# Patient Record
Sex: FEMALE | Race: WHITE | HISPANIC OR LATINO | Employment: FULL TIME | URBAN - METROPOLITAN AREA
[De-identification: names, ages, dates, MRNs, and addresses within clinical notes are randomized per-mention and may not be internally consistent; named-entity substitution may affect disease eponyms.]

---

## 2017-01-04 ENCOUNTER — HOSPITAL ENCOUNTER (INPATIENT)
Facility: HOSPITAL | Age: 22
LOS: 2 days | Discharge: HOME | End: 2017-01-06
Attending: OBSTETRICS & GYNECOLOGY | Admitting: OBSTETRICS & GYNECOLOGY
Payer: COMMERCIAL

## 2017-01-04 ENCOUNTER — ANESTHESIA (OUTPATIENT)
Dept: OBSTETRICS AND GYNECOLOGY | Facility: HOSPITAL | Age: 22
End: 2017-01-04
Payer: COMMERCIAL

## 2017-01-04 ENCOUNTER — ANESTHESIA EVENT (OUTPATIENT)
Dept: OBSTETRICS AND GYNECOLOGY | Facility: HOSPITAL | Age: 22
End: 2017-01-04
Payer: COMMERCIAL

## 2017-01-04 DIAGNOSIS — O47.9 UTERINE CONTRACTIONS DURING PREGNANCY: ICD-10-CM

## 2017-01-04 DIAGNOSIS — Z34.03 ENCOUNTER FOR SUPERVISION OF NORMAL FIRST PREGNANCY IN THIRD TRIMESTER: ICD-10-CM

## 2017-01-04 LAB
ABO + RH BLD: NORMAL
BASOPHILS # BLD AUTO: 0.01 K/UL
BASOPHILS NFR BLD: 0.1 %
BLD GP AB SCN CELLS X3 SERPL QL: NORMAL
DIFFERENTIAL METHOD: NORMAL
EOSINOPHIL # BLD AUTO: 0 K/UL
EOSINOPHIL NFR BLD: 0.3 %
ERYTHROCYTE [DISTWIDTH] IN BLOOD BY AUTOMATED COUNT: 12.6 %
FETAL CELL SCN BLD QL ROSETTE: NORMAL
HCT VFR BLD AUTO: 37.9 %
HGB BLD-MCNC: 13 G/DL
LYMPHOCYTES # BLD AUTO: 3.3 K/UL
LYMPHOCYTES NFR BLD: 31.8 %
MCH RBC QN AUTO: 29.9 PG
MCHC RBC AUTO-ENTMCNC: 34.3 %
MCV RBC AUTO: 87 FL
MONOCYTES # BLD AUTO: 0.9 K/UL
MONOCYTES NFR BLD: 8.1 %
NEUTROPHILS # BLD AUTO: 6.2 K/UL
NEUTROPHILS NFR BLD: 59 %
PLATELET # BLD AUTO: 210 K/UL
PMV BLD AUTO: 9.3 FL
RBC # BLD AUTO: 4.35 M/UL
WBC # BLD AUTO: 10.47 K/UL

## 2017-01-04 PROCEDURE — 59025 FETAL NON-STRESS TEST: CPT

## 2017-01-04 PROCEDURE — 85025 COMPLETE CBC W/AUTO DIFF WBC: CPT

## 2017-01-04 PROCEDURE — 72100002 HC LABOR CARE, 1ST 8 HOURS

## 2017-01-04 PROCEDURE — 86900 BLOOD TYPING SEROLOGIC ABO: CPT | Mod: 91

## 2017-01-04 PROCEDURE — 86850 RBC ANTIBODY SCREEN: CPT

## 2017-01-04 PROCEDURE — 25000003 PHARM REV CODE 250

## 2017-01-04 PROCEDURE — 27800516 HC TRAY, EPIDURAL COMBO: Performed by: ANESTHESIOLOGY

## 2017-01-04 PROCEDURE — 85461 HEMOGLOBIN FETAL: CPT

## 2017-01-04 PROCEDURE — 86900 BLOOD TYPING SEROLOGIC ABO: CPT

## 2017-01-04 PROCEDURE — 11000001 HC ACUTE MED/SURG PRIVATE ROOM

## 2017-01-04 PROCEDURE — 99211 OFF/OP EST MAY X REQ PHY/QHP: CPT

## 2017-01-04 PROCEDURE — 86901 BLOOD TYPING SEROLOGIC RH(D): CPT | Mod: 91

## 2017-01-04 PROCEDURE — 25000003 PHARM REV CODE 250: Performed by: OBSTETRICS & GYNECOLOGY

## 2017-01-04 PROCEDURE — 72100003 HC LABOR CARE, EA. ADDL. 8 HRS

## 2017-01-04 PROCEDURE — 27200710 HC EPIDURAL INFUSION PUMP SET: Performed by: ANESTHESIOLOGY

## 2017-01-04 PROCEDURE — 51702 INSERT TEMP BLADDER CATH: CPT

## 2017-01-04 PROCEDURE — 59409 OBSTETRICAL CARE: CPT | Mod: ,,, | Performed by: OBSTETRICS & GYNECOLOGY

## 2017-01-04 PROCEDURE — 25000003 PHARM REV CODE 250: Performed by: ANESTHESIOLOGY

## 2017-01-04 PROCEDURE — 27201127 HC VACUUM EXTRACTOR

## 2017-01-04 PROCEDURE — 36415 COLL VENOUS BLD VENIPUNCTURE: CPT

## 2017-01-04 PROCEDURE — 72200006 HC VAGINAL DELIVERY LEVEL III

## 2017-01-04 PROCEDURE — 10907ZC DRAINAGE OF AMNIOTIC FLUID, THERAPEUTIC FROM PRODUCTS OF CONCEPTION, VIA NATURAL OR ARTIFICIAL OPENING: ICD-10-PCS | Performed by: OBSTETRICS & GYNECOLOGY

## 2017-01-04 RX ORDER — FAMOTIDINE 10 MG/ML
20 INJECTION INTRAVENOUS ONCE AS NEEDED
Status: DISCONTINUED | OUTPATIENT
Start: 2017-01-04 | End: 2017-01-04

## 2017-01-04 RX ORDER — IBUPROFEN 800 MG/1
800 TABLET ORAL EVERY 8 HOURS PRN
Qty: 30 TABLET | Refills: 0 | Status: SHIPPED | OUTPATIENT
Start: 2017-01-04 | End: 2017-02-17

## 2017-01-04 RX ORDER — SODIUM CHLORIDE, SODIUM LACTATE, POTASSIUM CHLORIDE, CALCIUM CHLORIDE 600; 310; 30; 20 MG/100ML; MG/100ML; MG/100ML; MG/100ML
INJECTION, SOLUTION INTRAVENOUS CONTINUOUS
Status: DISCONTINUED | OUTPATIENT
Start: 2017-01-04 | End: 2017-01-04

## 2017-01-04 RX ORDER — SODIUM CITRATE AND CITRIC ACID MONOHYDRATE 334; 500 MG/5ML; MG/5ML
30 SOLUTION ORAL ONCE AS NEEDED
Status: DISCONTINUED | OUTPATIENT
Start: 2017-01-04 | End: 2017-01-04

## 2017-01-04 RX ORDER — ACETAMINOPHEN 325 MG/1
650 TABLET ORAL EVERY 6 HOURS PRN
Status: DISCONTINUED | OUTPATIENT
Start: 2017-01-04 | End: 2017-01-06 | Stop reason: HOSPADM

## 2017-01-04 RX ORDER — MISOPROSTOL 200 UG/1
600 TABLET ORAL
Status: DISCONTINUED | OUTPATIENT
Start: 2017-01-04 | End: 2017-01-04

## 2017-01-04 RX ORDER — OXYCODONE AND ACETAMINOPHEN 10; 325 MG/1; MG/1
1 TABLET ORAL EVERY 4 HOURS PRN
Status: DISCONTINUED | OUTPATIENT
Start: 2017-01-04 | End: 2017-01-06 | Stop reason: HOSPADM

## 2017-01-04 RX ORDER — OXYCODONE AND ACETAMINOPHEN 5; 325 MG/1; MG/1
1 TABLET ORAL EVERY 6 HOURS PRN
Qty: 30 TABLET | Refills: 0 | Status: SHIPPED | OUTPATIENT
Start: 2017-01-04 | End: 2017-02-17

## 2017-01-04 RX ORDER — ONDANSETRON 8 MG/1
8 TABLET, ORALLY DISINTEGRATING ORAL EVERY 8 HOURS PRN
Status: DISCONTINUED | OUTPATIENT
Start: 2017-01-04 | End: 2017-01-04

## 2017-01-04 RX ORDER — DOCUSATE SODIUM 100 MG/1
200 CAPSULE, LIQUID FILLED ORAL 2 TIMES DAILY PRN
Status: DISCONTINUED | OUTPATIENT
Start: 2017-01-04 | End: 2017-01-06 | Stop reason: HOSPADM

## 2017-01-04 RX ORDER — OXYTOCIN/RINGER'S LACTATE 20/1000 ML
2 PLASTIC BAG, INJECTION (ML) INTRAVENOUS CONTINUOUS
Status: DISCONTINUED | OUTPATIENT
Start: 2017-01-04 | End: 2017-01-04

## 2017-01-04 RX ORDER — IBUPROFEN 600 MG/1
600 TABLET ORAL EVERY 6 HOURS PRN
Status: DISCONTINUED | OUTPATIENT
Start: 2017-01-04 | End: 2017-01-06 | Stop reason: HOSPADM

## 2017-01-04 RX ORDER — OXYTOCIN/RINGER'S LACTATE 20/1000 ML
41.65 PLASTIC BAG, INJECTION (ML) INTRAVENOUS CONTINUOUS
Status: DISPENSED | OUTPATIENT
Start: 2017-01-04 | End: 2017-01-05

## 2017-01-04 RX ORDER — DIPHENHYDRAMINE HCL 25 MG
25 CAPSULE ORAL EVERY 4 HOURS PRN
Status: DISCONTINUED | OUTPATIENT
Start: 2017-01-04 | End: 2017-01-06 | Stop reason: HOSPADM

## 2017-01-04 RX ORDER — TERBUTALINE SULFATE 1 MG/ML
0.25 INJECTION SUBCUTANEOUS
Status: DISCONTINUED | OUTPATIENT
Start: 2017-01-04 | End: 2017-01-04

## 2017-01-04 RX ORDER — METOCLOPRAMIDE HYDROCHLORIDE 5 MG/ML
10 INJECTION INTRAMUSCULAR; INTRAVENOUS ONCE AS NEEDED
Status: DISCONTINUED | OUTPATIENT
Start: 2017-01-04 | End: 2017-01-04

## 2017-01-04 RX ORDER — ONDANSETRON 8 MG/1
8 TABLET, ORALLY DISINTEGRATING ORAL EVERY 8 HOURS PRN
Status: DISCONTINUED | OUTPATIENT
Start: 2017-01-04 | End: 2017-01-06 | Stop reason: HOSPADM

## 2017-01-04 RX ORDER — ZOLPIDEM TARTRATE 5 MG/1
5 TABLET ORAL NIGHTLY PRN
Status: DISCONTINUED | OUTPATIENT
Start: 2017-01-04 | End: 2017-01-06 | Stop reason: HOSPADM

## 2017-01-04 RX ORDER — OXYCODONE AND ACETAMINOPHEN 5; 325 MG/1; MG/1
1 TABLET ORAL EVERY 4 HOURS PRN
Status: DISCONTINUED | OUTPATIENT
Start: 2017-01-04 | End: 2017-01-06 | Stop reason: HOSPADM

## 2017-01-04 RX ORDER — DIPHENHYDRAMINE HYDROCHLORIDE 50 MG/ML
25 INJECTION INTRAMUSCULAR; INTRAVENOUS EVERY 4 HOURS PRN
Status: DISCONTINUED | OUTPATIENT
Start: 2017-01-04 | End: 2017-01-06 | Stop reason: HOSPADM

## 2017-01-04 RX ADMIN — OXYCODONE HYDROCHLORIDE AND ACETAMINOPHEN 1 TABLET: 5; 325 TABLET ORAL at 06:01

## 2017-01-04 RX ADMIN — OXYCODONE HYDROCHLORIDE AND ACETAMINOPHEN 1 TABLET: 10; 325 TABLET ORAL at 09:01

## 2017-01-04 RX ADMIN — IBUPROFEN 600 MG: 600 TABLET, FILM COATED ORAL at 05:01

## 2017-01-04 RX ADMIN — SODIUM CHLORIDE, SODIUM LACTATE, POTASSIUM CHLORIDE, AND CALCIUM CHLORIDE 1000 ML: .6; .31; .03; .02 INJECTION, SOLUTION INTRAVENOUS at 07:01

## 2017-01-04 RX ADMIN — Medication 12 ML/HR: at 07:01

## 2017-01-04 RX ADMIN — Medication 2 MILLI-UNITS/MIN: at 11:01

## 2017-01-04 RX ADMIN — SODIUM CHLORIDE, SODIUM LACTATE, POTASSIUM CHLORIDE, AND CALCIUM CHLORIDE: .6; .31; .03; .02 INJECTION, SOLUTION INTRAVENOUS at 08:01

## 2017-01-04 NOTE — PLAN OF CARE
0700 Report received from YAMILET Morales Rn at bedside, , spont labor, SVE at 0600 2/60/-4, Admit orders received from Dr Sarmiento per BINDU Morales RN, Lab at bedside, IV bolus in progress for epidural, anesthesia notified pt is ready for epidural. Plan of care reviewed for labor and epidural, Pt can recall information, mother at bedside.     0735 Anesthesia at bedside, Pt positioned for epidural, time out at 0740, start at 0742, test dose at 0750, negative, Pt educated on strict bedrest, purpose of jimenez signs to report to Rn, Pt can recall all information.     0830 Dr Sarmiento updated on pt progress, will come check pt between [id number]. Pt resting comfortably. Mother at bedside.     0910 Dr Sarmiento at bedside, SVE 4/80/-3, AROM, clear, Pt denies pain reviewed plan of care, will recheck cervix at 1100. Pt can recall all information.     1100 SVE 5/90/-3, Pitocin started per orders, Pt educated on Pitocin, Pt moved to right lateral to aid in fetal decent. Pt resting comfortably, denies pain.     1300 Early decel noted, SVE 9/90/0, Dr Sarmiento notified of pt progress, will recheck at 1400,     1400 SVE complete/+1, pt pushes to +2, decel during pushing, pt placed in right tilt. Dr Sarmiento paged. Will be on floor shortly. Room set for delivery.     1425 Dr Sarmiento at bedside, pushing begins at 1427, at 1438 vacuum applied, FHR 80's not recovering, 6 pulls with 2 separate ctx, RML episiotomy, delivery of viable baby girl at 1443, 8/9 apgars. Recovery plan reviewed with pt and pt family.     1545 Pulse ox reading was 180-200, manual pulse taken 92 bpm.

## 2017-01-04 NOTE — PROGRESS NOTES
DATE OF DELIVERY: 01/04/2017  TYPE OF DELIVERY: vacuum assisted vaginal delivery    NARRATIVE:  The patient was fully dilated and pushing under epidural anesthesia.  There was a vacuum assisted vaginal delivery at +2 station of a viable female infant over an RML episiotomy secondary to terminal bradycardia to 80s without spontaneous return to baseline. Infant noted to be in EULALIA position. Vacuum was applied twice pressure of 500mg Hg. Two 2 pulls for 30 seconds each without any pop offs. There was not a nuchal cord. The infant was placed on mothers abdomen for skin to skin and bulb suctioning performed.The umbilical cord was doubly clamped and cut.  The infant was handed to awaiting nurse.  Apgars were later awarded by the nurse of 8 and 9.  Weight 8lb 1.0oz. Umbilical venous blood obtained. Placenta delivered spontaneously and IV pitocin given. With uterine massage the uterus was firm and perivaginal bleeding was normal.  The RML was repaired with 2-0 chromic x2. There was also a hemostatic 1st degree laceration periurethral that was not repaired.  No cervical lacerations. Estimated blood loss 200cc. Needle count correct and vaginal sweep performed. Patient tolerated delivery well.    Venessa Sarmiento MD  OB/GYN  Pager: [phone]

## 2017-01-04 NOTE — ANESTHESIA PREPROCEDURE EVALUATION
2017  Joy Briggs is a 21 y.o., female  with no PMH here for IOL epidural.    [facility] Anesthesia Evaluation    I have reviewed the Patient Summary Reports.    I have reviewed the Nursing Notes.      Review of Systems  Anesthesia Hx:  Denies Family Hx of Anesthesia complications.    Social:  Non-Smoker, No Alcohol Use    Hematology/Oncology:  Hematology Normal        Cardiovascular:  Cardiovascular Normal  Denies Hypertension.     Pulmonary:  Pulmonary Normal    Renal/:  Renal/ Normal     Neurological:  Neurology Normal    Endocrine:  Endocrine Normal Denies Diabetes.        Physical Exam  General:  Well nourished    Airway/Jaw/Neck:  Airway Findings: Mouth Opening: Normal Tongue: Normal  Mallampati: I  TM Distance: Normal, at least 6 cm         Dental:  DENTAL FINDINGS: Normal   Chest/Lungs:  Chest/Lungs Findings: Clear to auscultation, Normal Respiratory Rate     Heart/Vascular:  Heart Findings: Rate: Normal  Rhythm: Regular Rhythm  Sounds: Normal      Musculoskeletal:  Musculoskeletal Findings: Normal         Anesthesia Plan  Type of Anesthesia, risks & benefits discussed:  Anesthesia Type:  CSE  Patient's Preference:   Intra-op Monitoring Plan:   Intra-op Monitoring Plan Comments:   Post Op Pain Control Plan:   Post Op Pain Control Plan Comments:   Induction:    Beta Blocker:  Patient is not currently on a Beta-Blocker (No further documentation required).       Informed Consent: Patient understands risks and agrees with Anesthesia plan.  Questions answered. Anesthesia consent signed with patient.  ASA Score: 2  emergent   Day of Surgery Review of History & Physical: I have interviewed and examined the patient. I have reviewed the patient's H&P dated:  There are no significant changes.  H&P update referred to the provider.         Ready For Surgery From Anesthesia Perspective.

## 2017-01-04 NOTE — PLAN OF CARE
Problem: Fall Risk (Adult)  Goal: Identify Related Risk Factors and Signs and Symptoms  Related risk factors and signs and symptoms are identified upon initiation of Human Response Clinical Practice Guideline (CPG)   Outcome: Ongoing (interventions implemented as appropriate)  Pt received epidural, understands strict bedrest, fall risk band on.

## 2017-01-04 NOTE — PROGRESS NOTES
"S: 21 y.o.  at 40w1d, labor    O:    Visit Vitals    /65    Pulse 80    Temp 98.2 °F (36.8 °C)    Resp 16    Ht 5' 8" (1.727 m)    Wt 64.4 kg (142 lb)    LMP 2016    SpO2 96%    Breastfeeding No    BMI 21.59 kg/m2       FHT: 130, mod BTBV, +accels  [place]: ctx q 2 min  SVE:4/80/-3 AROM clear, moderate amount      ASSESSMENT: 40w1d   Patient Active Problem List   Diagnosis    Encounter for supervision of normal first pregnancy in second trimester    Abdominal pain during pregnancy    Pregnancy    Rh negative status during pregnancy in third trimester-rhogam given 10/11/16    Uterine contractions during pregnancy    Encounter for supervision of normal first pregnancy in third trimester       PLAN:    -Continue Close Maternal/Fetal Monitoring  -Recheck 2 hours or PRN      Venessa Sarmiento MD  OB/GYN  Pager: [phone]    "

## 2017-01-04 NOTE — IP AVS SNAPSHOT
[facility]  [address]  [place] [address]  Phone: [phone]           Patient Discharge Instructions     Our goal is to set you up for success. This packet includes information on your condition, medications, and your home care. It will help you to care for yourself so you don't get sicker and need to go back to the hospital.     Please ask your nurse if you have any questions.        There are many details to remember when preparing to leave the hospital. Here is what you will need to do:    1. Take your medicine. If you are prescribed medications, review your Medication List in the following pages. You may have new medications to  at the pharmacy and others that you'll need to stop taking. Review the instructions for how and when to take your medications. Talk with your doctor or nurses if you are unsure of what to do.     2. Go to your follow-up appointments. Specific follow-up information is listed in the following pages. Your may be contacted by a transition nurse or clinical provider about future appointments. Be sure we have all of the phone numbers to reach you, if needed. Please contact your provider's office if you are unable to make an appointment.     3. Watch for warning signs. Your doctor or nurse will give you detailed warning signs to watch for and when to call for assistance. These instructions may also include educational information about your condition. If you experience any of warning signs to your health, call your doctor.               [facility]  Unless otherwise directed by your provider, please contact [facility], our nurse care line that is available for 24/7 assistance.     [phone] (toll-free)    Registered nurses in the [facility] provide clinical advisement, health education, appointment booking, and other advisory services.                    ** Verify the list of medication(s) below is accurate and up to date. Carry this  with you in case of emergency. If your medications have changed, please notify your healthcare provider.             Medication List      START taking these medications        Additional Info                      ibuprofen 800 MG tablet   Commonly known as:  ADVIL,MOTRIN   Quantity:  30 tablet   Refills:  0   Dose:  800 mg    Last time this was given:  600 mg on 1/6/2017  4:18 AM   Instructions:  Take 1 tablet (800 mg total) by mouth every 8 (eight) hours as needed for Pain.     Begin Date    AM    Noon    PM    Bedtime       oxycodone-acetaminophen 5-325 mg per tablet   Commonly known as:  PERCOCET   Quantity:  30 tablet   Refills:  0   Dose:  1 tablet    Last time this was given:  1 tablet on 1/4/2017  6:00 PM   Instructions:  Take 1 tablet by mouth every 6 (six) hours as needed for Pain.     Begin Date    AM    Noon    PM    Bedtime            Where to Get Your Medications      You can get these medications from any pharmacy     Bring a paper prescription for each of these medications     ibuprofen 800 MG tablet    oxycodone-acetaminophen 5-325 mg per tablet                  Please bring to all follow up appointments:    1. A copy of your discharge instructions.  2. All medicines you are currently taking in their original bottles.  3. Identification and insurance card.    Please arrive 15 minutes ahead of scheduled appointment time.    Please call 24 hours in advance if you must reschedule your appointment and/or time.        Your Scheduled Appointments     Feb 17, 2017 10:30 AM CST   Post Partum with MD [place] [facility] ([place])    [address] [facility]  [address]th [address], [address]  [place] [address] [phone]              Follow-up Information     Follow up with Venessa Sarmiento MD In 6 weeks.    Specialties:  Obstetrics, Obstetrics and Gynecology    Why:  Postpartum Visit    Contact information:    [address] [facility]  [address]  [place] [id number] [phone]          Discharge  Instructions     Future Orders    Activity as tolerated     Call MD for:  difficulty breathing or increased cough     Call MD for:  increased confusion or weakness     Call MD for:  persistent dizziness, light-headedness, or visual disturbances     Call MD for:  persistent nausea and vomiting or diarrhea     Call MD for:  severe persistent headache     Call MD for:  severe uncontrolled pain     Call MD for:  temperature >100.4     Call MD for:  worsening rash     Diet general     Questions:    Total calories:      Fat restriction, if any:      Protein restriction, if any:      Na restriction, if any:      Fluid restriction:      Additional restrictions:          Discharge Instructions       Patient Discharge Instructions for Postpartum Women    Resume Regular Diet  Increase activity gradually, no heavy lifting  Shower  No tampons, douching or sexual intercourse.  Discuss birth control options with your physician.  Wear a support bra  Return to work/school when you've been cleared by a physician    Call your physician if     *Fever of 100.4 or higher  *Persistent nausea/ vomiting  *Incisional drainage  *Heavy vaginal bleeding or large clots (Heavy bleeding is soaking 1 pad in an hour)  *Swelling and pain in arms or legs  *Severe headaches, blurred vision or fainting  *Shortness of breath  *Frequency and burning with urination  *Signs of postpartum depression, discuss these signs with your physician    Call lactation services for questions regarding feeding, nipple and breast care, and general questions about lactation.  They can be reached at [phone]         Understanding Postpartum Depression    You've just had a baby.  You know you should be excited and happy.  But instead you find yourself crying for no reason.  You may have trouble coping with your daily tasks.  You feel sad, tired, and hopeless most of the time.  You may even feel ashamed or guilty.  But what you're going through is not your fault and you  "can feel better.  Talk to your doctor.  He or she can help.    Depression After Childbirth    You may be weepy and tired right after giving birth.  These feelings are normal.  They're sometimes called the "baby blues."  These blues go away 2-3 weeks.  However, postpartum (meaning "after birth") depression lasts much longer and is more sever than the "baby blues."  It can make you feel sad and hopeless.  You may also fear that your baby will be harmed and worry about being a bad mother.      What is Depression?    Depression is a mood disorder that affects the way you think and feel.  The most common symptom is a feeling of deep sadness.  You may also feel as if you just can't cope with life.    Other symptoms include:      * Gaining or loosing weight  * Sleeping too much or too little  * Feeling tired all the time  * Feeling restless  * Fears of harming your baby   * Lack of interest in your baby  * Feeling worthless or guilty  * No longer finding pleasure in things you used to  * Having trouble thinking clearly or making decisions  * Thoughts of hurting yourself or your baby    What Causes Postpartum Depression    The exact causes of postpartum depression isn't known.  It may be due to changes in your hormones during and after childbirth.  You may also be tired from caring for your baby and adjusting to being a mother.  All these factors may make you feel depressed.  In some cases, your genes may also play a role.    Depression Can Be Treated    The good news is that there are many ways to treat postpartum depression.  Talking to your doctor is the first step toward feeling better.    Resources:    * National [place] [employer] Mental Health  -- 335.976.8728    www.nimh.nih.gov    * National [place] on Mental Illness --882.503.6001    Www.edwardo.org    * Mental Health [place] -- 760.524.1837     Www.nmha.org    * National Suicide Hotline --848.436.3231 (800-SUICIDE)    5480-4109 [employer]  All rights " "reserved.  This information is not intended as a substitute for professional medical care.  Always follow up with your healthcare professional's instructions.            Primary Diagnosis     Your primary diagnosis was:  Prenatal Care      Admission Information     Date & Time Provider Department CSN    1/4/2017  3:52 AM Venessa Sarmiento MD [facility] [id number]      Care Providers     Provider Role Specialty Primary office phone    Venessa Sarmiento MD Attending Provider Obstetrics [phone]      Your Vitals Were     BP Pulse Temp Resp Height Weight    101/60 (BP Location: Left arm, Patient Position: Lying, BP Method: Automatic) 70 98.6 °F (37 °C) (Oral) 18 5' 8" (1.727 m) 64.4 kg (142 lb)    Last Period SpO2 BMI          03/29/2016 100% 21.59 kg/m2        Recent Lab Values     No lab values to display.      Allergies as of 1/6/2017     No Known Allergies      Advance Directives     An advance directive is a document which, in the event you are no longer able to make decisions for yourself, tells your healthcare team what kind of treatment you do or do not want to receive, or who you would like to make those decisions for you.  If you do not currently have an advance directive, [facility] encourages you to create one.  For more information call:  [phone]), [phone] ([phone]),  or log on to [email or web address].        Language Assistance Services     ATTENTION: Language assistance services are available, free of charge. Please call [phone].      ATENCIÓN: Si habla español, tiene a gramajo disposición servicios gratuitos de asistencia lingüística. Llame al [phone].     [facility] Ý: N?u b?n nói Ti?ng Vi?t, có các d?ch v? h? tr? ngôn ng? mi?n phí dành cho b?n. G?i s? [phone].        [facility] Sign-Up     Activating your [facility] account is as easy as 1-2-3!     1) Visit [email or web address], select Sign Up Now, enter this activation code and your date of birth, then " select Next.  [email or web address]  Expires: 1/29/2017  9:14 AM      2) Create a username and password to use when you visit [facility] in the future and select a security question in case you lose your password and select Next.    3) Enter your e-mail address and click Sign Up!    Additional Information  If you have questions, please e-mail [email or web address] or call [phone] to talk to our [facility] staff. Remember, [facility] is NOT to be used for urgent needs. For medical emergencies, dial 911.          [facility] complies with applicable Federal civil rights laws and does not discriminate on the basis of race, color, national origin, age, disability, or sex.

## 2017-01-04 NOTE — H&P
"     HISTORY AND PHYSICAL       OBSTETRICS          Subjective:       Joy Briggs is a 21 y.o.  female with IUP at 40w1d weeks gestation who presents in labor. This IUP is complicated by RH negative status.  Patient reports contractions, denies vaginal bleeding, denies LOF.   Fetal Movement: normal.     PMHx: History reviewed. No pertinent past medical history.    PSHx: History reviewed. No pertinent past surgical history.    All: Review of patient's allergies indicates:  No Known Allergies    SH:   Social History     Social History    Marital status: Single     Spouse name: N/A    Number of children: N/A    Years of education: N/A     Occupational History    Not on file.     Social History Main Topics    Smoking status: Never Smoker    Smokeless tobacco: Not on file    Alcohol use No    Drug use: No    Sexual activity: Yes     Other Topics Concern    Not on file     Social History Narrative    ** Merged History Encounter **            FH:   Family History   Problem Relation Age of Onset    Diabetes Maternal Grandmother     Hypertension Maternal Grandmother        OBHx:   Obstetric History       T0      TAB0   SAB0   E0   M0   L0       # Outcome Date GA Lbr Ganesh/2nd Weight Sex Delivery Anes PTL Lv   1 Current                   Objective:         Visit Vitals    /65    Pulse 80    Temp 98.2 °F (36.8 °C)    Resp 16    Ht 5' 8" (1.727 m)    Wt 64.4 kg (142 lb)    LMP 2016    SpO2 96%    Breastfeeding No    BMI 21.59 kg/m2       General:   alert, appears stated age and cooperative   Lungs:   clear to auscultation bilaterally   Heart:   regular rate and rhythm and S1, S2 normal   Abdomen:  soft, gravid, nontender   Extremities negative edema, negative erythema   FHT: 125, mod BTBV, +accels , reactive and reassuring                 TOCO: Q 2 minutes   Cervix: 2.5/70/-3 per nursing     Lab Review  Blood Type O NEG  GBBS: negative  Rubella: Immune  RPR: NR  HIV: " negative  HepB: negative    Assessment:       40w1d weeks gestation, labor    Patient Active Problem List   Diagnosis    Encounter for supervision of normal first pregnancy in second trimester    Abdominal pain during pregnancy    Pregnancy    Rh negative status during pregnancy in third trimester-rhogam given 10/11/16    Uterine contractions during pregnancy    Encounter for supervision of normal first pregnancy in third trimester          Plan:        - Admit to L&D  - Consents reviewed   - Epidural per Anesthesia  - Recheck in 2 hrs or PRN  - CBC, type and screen  -pit augmentation as needed        Venessa Sarmiento MD  OB/GYN  Pager: [phone]

## 2017-01-04 NOTE — L&D DELIVERY NOTE
Delivery Information for  Mendez Briggs    Birth information:  YOB: 2019   Time of birth: 2:43 PM   Sex: female   Head Delivery Date/Time: 2017  2:43 PM   Delivery type: Vaginal, Vacuum (Extractor)   Gestational Age: 40w1d    Delivery Providers    Delivering clinician:  GEETA PALACIO   Other personnel:   Provider Role   DICK WOLFE Delivery Nurse   DANILO CHANG Delivery Assist   MARICARMEN CLAROS Surgical Tech                   Measurements    Weight:  3679 g            Assessment    Living status:  Yes   Apgars    1 Minute:   5 Minute:   10 Minute 15 Minute 20 Minute   Skin Color: 0  1       Heart Rate: 2  2       Reflex Irritability: 2  2       Muscle Tone: 2  2       Respiratory Effort: 2  2       Total: 8  9                  Apgars Assigned By:  DANILO CHANG RN          Assisted Delivery Details:    Forceps attempted?:  No   Vacuum extractor attempted?:  Yes   Vacuum indications:  Fetal Heart Rate or Rhythm Abnormality   Vacuum type:  Kiwi Pro (bell)   Vacuum application location:  Outlet   First attempt time vacuum applied:  2017 1438   First attempt time vacuum removed:  2017 1442   Number of pop offs:  0   Number of pulls with vacuum:  6   Low end pressure range (mmHg):  green    High end pressure range (mmHg):  yellow   Total vacuum application time:  4 minutes   Vacuum applied by:  DR PALACIO   Failed vacuum delivery?:  No             Shoulder Dystocia    Shoulder dystocia present?:  No                                             Presentation and Position    Presentation: Vertex   Position: Right    Occiput    Anterior               Interventions/Resuscitation    Method:  Blow By Oxygen, Tactile Stimulation        Cord    Vessels:  3 vessels   Complications:  None   Delayed Cord Clamping?:  No   Cord Blood Disposition:  Sent with Baby   Gases Sent?:  No   Stem Cell Collection (by MD):  No        Placenta    Date and time:  2017  2:48 PM             Labor Events:       labor: No     Labor Onset Date/Time: 2017 04:00     Dilation Complete Date/Time: 2017 14:00     Start Pushing Date/Time: 2017 14:27     Rupture Date/Time:              Rupture type:           Fluid Amount:        Fluid Color:        Fluid Odor:        Membrane Status (PeriCalm): ARM (Artificial Rupture)      Rupture Date/Time (PeriCalm): 2017 09:15:00      Fluid Amount (PeriCalm): Moderate      Fluid Color (PeriCalm): Clear       steroids: None     Antibiotics given for GBS: No     Induction: none     Indications for induction:        Augmentation: amniotomy;oxytocin     Indications for augmentation:       Labor complications: None     Additional complications:          Cervical ripening:                     Delivery:      Episiotomy: Right Mediolateral     Indication for Episiotomy: Instrumented Delivery     Perineal Lacerations: None Repaired:      Periurethral Laceration: right Repaired: No   Labial Laceration: none Repaired:     Sulcus Laceration: none Repaired:     Vaginal Laceration: No Repaired:     Cervical Laceration: No Repaired:     Repair suture:       Repair # of packets: 2     Blood loss (ml): 200     Vaginal Sweep Performed: Yes     Surgicount Correct: Yes       Other providers:       Anesthesia    Method:  Spinal, Epidural              Details (if applicable):  Trial of Labor      Categorization:      Priority:     Indications for :     Incision Type:       Additional  information:  Forceps:    Vacuum:    Breech:    Observed anomalies    Other (Comments):         DATE OF DELIVERY: 2017  TYPE OF DELIVERY: vacuum assisted vaginal delivery    NARRATIVE:  The patient was fully dilated and pushing under epidural anesthesia.  There was a vacuum assisted vaginal delivery at +2 station of a viable female infant over an RML episiotomy secondary to terminal bradycardia to 80s without spontaneous  return to baseline. Infant noted to be in EULALIA position. Vacuum was applied twice pressure of 500mg Hg. Two 2 pulls for 30 seconds each without any pop offs. There was not a nuchal cord. The infant was placed on mothers abdomen for skin to skin and bulb suctioning performed.The umbilical cord was doubly clamped and cut.  The infant was handed to awaiting nurse.  Apgars were later awarded by the nurse of 8 and 9.  Weight 8lb 1.0oz. Umbilical venous blood obtained. Placenta delivered spontaneously and IV pitocin given. With uterine massage the uterus was firm and perivaginal bleeding was normal.  The RML was repaired with 2-0 chromic x2. There was also a hemostatic 1st degree laceration periurethral that was not repaired.  No cervical lacerations. Estimated blood loss 200cc. Needle count correct and vaginal sweep performed. Patient tolerated delivery well.    Venessa Sarmiento MD  OB/GYN  Pager: [phone]

## 2017-01-04 NOTE — PLAN OF CARE
Problem: Pain, Acute (Adult)  Goal: Identify Related Risk Factors and Signs and Symptoms  Related risk factors and signs and symptoms are identified upon initiation of Human [employer] Clinical Practice Guideline (CPG)  Outcome: Ongoing (interventions implemented as appropriate)  Pt received epidural pain was 8/10 currently a 0/10.

## 2017-01-04 NOTE — ANESTHESIA PROCEDURE NOTES
CSE    Patient location during procedure: OB  Start time: 1/4/2017 7:43 AM  Timeout: 1/4/2017 7:42 AM  End time: 1/4/2017 7:55 AM  Staffing  Anesthesiologist: DEEP CHEN  Resident/CRNA: JOSH PALAFOX  Performed by: resident/CRNA   Preanesthetic Checklist  Completed: patient identified, site marked, surgical consent, pre-op evaluation, timeout performed, IV checked, risks and benefits discussed and monitors and equipment checked  CSE  Patient position: sitting  Prep: ChloraPrep  Patient monitoring: heart rate, cardiac monitor, continuous pulse ox and frequent blood pressure checks  Approach: midline  Spinal Needle  Needle type: pencil-tip   Needle gauge: 25 G  Needle length: 5 in  Epidural Needle  Injection technique: KIET air  Needle type: Tuohy   Needle gauge: 17 G  Needle length: 3.5 in  Needle insertion depth: 5.5 cm  Location: L3-4  Needle localization: anatomical landmarks  Catheter  Catheter type: springwound  Catheter size: 19 G  Catheter at skin depth: 11 cm  Test dose: lidocaine 2% with Epi 1-to-200,000  Additional Documentation: negative aspiration for heme, negative test dose and no paresthesia on injection  Assessment  Sensory level: T6   Dermatomal levels determined by pinch or prick  Intrathecal Medications:  Bolus administered: 1.5 mL of 0.2 ropivacaine  administered: primary anesthetic and 3 mcg of  fentanyl  Additional Notes  Infusion of Ropiv 0.2% with fent 2mcg/ml begun.

## 2017-01-04 NOTE — PLAN OF CARE
Problem: Patient Care Overview  Goal: Individualization & Mutuality  Outcome: Ongoing (interventions implemented as appropriate)  Pt admitted in labor, received epidural. Pt educated on plan of care.

## 2017-01-04 NOTE — PROGRESS NOTES
0359- Pt of Dr. Sarmiento.  @ 40.1 wks arrived to [facility] c/o ctx q 3-4 min since 3 am. Pt also reports some spotting. Reports +FM and denies lof. Pt gowned and placed on monitor. Abd soft/ nontender. FHts noted in RLQ w/ active fm noted. Sve done. Cervix 1/60/-4 posterior soft. Some old brownish blood noted on exam glove. PMH reviewed and POC discussed. Pt and spouse verbalized understanding. Will notify on call MD.   4601- Dr. Ruiz phoned. Notified of pts arrival.  @ 40.1 wks for Dr. Sarmiento. Pt scheduled for IOL on 17 @ 9 pm. Came in tonight c/o ctx and spotting. UC's noted q 2-4 min, FHts reassuring. Cervix 1/60/-4 soft/ posterior. Orders noted to recheck cervix @ 7 am and notify Dr. Sarmiento.   0643- Dr. Sarmiento notified pt here since 0400. @ 40.1 wks. Regular ctx q 2-3 min. Pt hurting and wants epidural. Cervix was 1/60/-4 @ 0400 and is now 2/60/-4 intact.  Orders noted to admit for labor epidural and start pitocin as needed.   0650- POC discussed w/ pt to admit for labor. Pt wants to get epidural asap. Pt moved to R, labs ordered and IV started.   0700- Bedside report given to CAROLINE Mehta RN.

## 2017-01-04 NOTE — IP AVS SNAPSHOT
[facility]  [address]  [place] [address]  Phone: [phone]           I have received a copy of my After Visit Summary and discharge instructions from [facility].    INSTRUCTIONS RECEIVED AND UNDERSTOOD BY:                     Patient/Patient Representative: ________________________________________________________________     Date/Time: ________________________________________________________________                     Instructions Given By: ________________________________________________________________     Date/Time: ________________________________________________________________

## 2017-01-05 LAB
ABO + RH BLD: NORMAL
BASOPHILS # BLD AUTO: 0.02 K/UL
BASOPHILS NFR BLD: 0.1 %
BLD GP AB SCN CELLS X3 SERPL QL: NORMAL
DIFFERENTIAL METHOD: ABNORMAL
EOSINOPHIL # BLD AUTO: 0 K/UL
EOSINOPHIL NFR BLD: 0.2 %
ERYTHROCYTE [DISTWIDTH] IN BLOOD BY AUTOMATED COUNT: 12.6 %
HCT VFR BLD AUTO: 34.5 %
HGB BLD-MCNC: 11.6 G/DL
INJECT RH IG VOL PATIENT: NORMAL ML
LYMPHOCYTES # BLD AUTO: 2.7 K/UL
LYMPHOCYTES NFR BLD: 18.4 %
MCH RBC QN AUTO: 30.1 PG
MCHC RBC AUTO-ENTMCNC: 33.6 %
MCV RBC AUTO: 89 FL
MONOCYTES # BLD AUTO: 1.1 K/UL
MONOCYTES NFR BLD: 7.3 %
NEUTROPHILS # BLD AUTO: 10.9 K/UL
NEUTROPHILS NFR BLD: 73.6 %
PLATELET # BLD AUTO: 162 K/UL
PMV BLD AUTO: 9.4 FL
RBC # BLD AUTO: 3.86 M/UL
WBC # BLD AUTO: 14.86 K/UL

## 2017-01-05 PROCEDURE — 63600519 RHOGAM PHARM REV CODE 636 ALT 250 W HCPCS: Performed by: OBSTETRICS & GYNECOLOGY

## 2017-01-05 PROCEDURE — 36415 COLL VENOUS BLD VENIPUNCTURE: CPT

## 2017-01-05 PROCEDURE — 11000001 HC ACUTE MED/SURG PRIVATE ROOM

## 2017-01-05 PROCEDURE — 25000003 PHARM REV CODE 250: Performed by: OBSTETRICS & GYNECOLOGY

## 2017-01-05 PROCEDURE — 85025 COMPLETE CBC W/AUTO DIFF WBC: CPT

## 2017-01-05 RX ADMIN — OXYCODONE HYDROCHLORIDE AND ACETAMINOPHEN 1 TABLET: 10; 325 TABLET ORAL at 08:01

## 2017-01-05 RX ADMIN — HUMAN RHO(D) IMMUNE GLOBULIN 300 MCG: 300 INJECTION, SOLUTION INTRAMUSCULAR at 06:01

## 2017-01-05 RX ADMIN — OXYCODONE HYDROCHLORIDE AND ACETAMINOPHEN 1 TABLET: 10; 325 TABLET ORAL at 02:01

## 2017-01-05 RX ADMIN — OXYCODONE HYDROCHLORIDE AND ACETAMINOPHEN 1 TABLET: 10; 325 TABLET ORAL at 10:01

## 2017-01-05 RX ADMIN — OXYCODONE HYDROCHLORIDE AND ACETAMINOPHEN 1 TABLET: 10; 325 TABLET ORAL at 06:01

## 2017-01-05 RX ADMIN — IBUPROFEN 600 MG: 600 TABLET, FILM COATED ORAL at 12:01

## 2017-01-05 RX ADMIN — IBUPROFEN 600 MG: 600 TABLET, FILM COATED ORAL at 06:01

## 2017-01-05 RX ADMIN — IBUPROFEN 600 MG: 600 TABLET, FILM COATED ORAL at 08:01

## 2017-01-05 RX ADMIN — OXYCODONE HYDROCHLORIDE AND ACETAMINOPHEN 1 TABLET: 10; 325 TABLET ORAL at 04:01

## 2017-01-05 NOTE — PROGRESS NOTES
"PPD#1 S/P     Subjective: No complaints    Objective:   Visit Vitals    /62 (BP Location: Right arm, Patient Position: Sitting, BP Method: Automatic)    Pulse 79    Temp 98 °F (36.7 °C) (Oral)    Resp 16    Ht 5' 8" (1.727 m)    Wt 64.4 kg (142 lb)    LMP 2016    SpO2 (!) 90%    Breastfeeding Unknown    BMI 21.59 kg/m2       H/H:   Lab Results   Component Value Date    WBC 14.86 (H) 2017    HGB 11.6 (L) 2017    HCT 34.5 (L) 2017    MCV 89 2017     2017       Fundus: Firm, non- tender  Lochia: Moderate  Extremities: Non-tender, no edema    Assessment: PPD #1 S/P     Plan: Routine progressive care      "

## 2017-01-05 NOTE — PROGRESS NOTES
1900- Report received from CAROLINE Mehta RN. Care assumed.   1935- Pt ambulated to restroom w/ assist. Vopids w/out difficulty. Denies pain at this time. Pericare provided. Dermaplast and tucks applied as needed.   1941- Pt transferred to mother/baby room 348.   2005- Report given to DIPIKA Johns RN.

## 2017-01-05 NOTE — ANESTHESIA POSTPROCEDURE EVALUATION
"Anesthesia Post Evaluation    Patient: Joy Briggs    Procedure(s) Performed: * No procedures listed *    Final Anesthesia Type: CSE  Patient location during evaluation: labor & delivery  Patient participation: Yes- Able to Participate  Level of consciousness: awake and alert and oriented  Post-procedure vital signs: reviewed and stable  Pain management: adequate  Airway patency: patent  PONV status at discharge: No PONV  Anesthetic complications: no      Cardiovascular status: blood pressure returned to baseline and hemodynamically stable  Respiratory status: unassisted, spontaneous ventilation and room air  Hydration status: euvolemic  Follow-up not needed.        Visit Vitals    /78 (BP Location: Right arm, Patient Position: Sitting, BP Method: Automatic)    Pulse 91    Temp 36.4 °C (97.5 °F) (Oral)    Resp 18    Ht 5' 8" (1.727 m)    Wt 64.4 kg (142 lb)    LMP 03/29/2016    SpO2 100%    Breastfeeding Unknown    BMI 21.59 kg/m2       Pain/Ricarda Score: Pain Assessment Performed: Yes (1/4/2017  9:53 PM)  Pain Rating Prior to Med Admin: 7 (1/5/2017 10:51 AM)  Pain Rating Post Med Admin: 0 (1/5/2017 11:50 AM)      "

## 2017-01-06 VITALS
OXYGEN SATURATION: 98 % | BODY MASS INDEX: 21.52 KG/M2 | RESPIRATION RATE: 18 BRPM | TEMPERATURE: 98 F | DIASTOLIC BLOOD PRESSURE: 73 MMHG | HEART RATE: 85 BPM | HEIGHT: 68 IN | SYSTOLIC BLOOD PRESSURE: 116 MMHG | WEIGHT: 142 LBS

## 2017-01-06 PROCEDURE — 25000003 PHARM REV CODE 250: Performed by: OBSTETRICS & GYNECOLOGY

## 2017-01-06 RX ADMIN — IBUPROFEN 600 MG: 600 TABLET, FILM COATED ORAL at 04:01

## 2017-01-06 RX ADMIN — OXYCODONE HYDROCHLORIDE AND ACETAMINOPHEN 1 TABLET: 10; 325 TABLET ORAL at 05:01

## 2017-01-06 RX ADMIN — OXYCODONE HYDROCHLORIDE AND ACETAMINOPHEN 1 TABLET: 10; 325 TABLET ORAL at 12:01

## 2017-01-06 NOTE — PROGRESS NOTES
POSTPARTUM PROGRESS NOTE  Subjective:  Joy Briggs is a 21 y.o. female PPD #2   status post Spontaneous vaginal delivery. Patient is  doing well this morning. She denies nausea, vomiting, fever or chills. Patient reports mild abdominal pain that is well relieved by PO pain medications. Lochia is mild and stable. Patient is voiding without difficulty and  ambulating with no difficulty. Patient does not plan to breast feed.    Objective:     Temp:  [97.5 °F (36.4 °C)-98.6 °F (37 °C)] 98.6 °F (37 °C)  Pulse:  [70-91] 70  Resp:  [18] 18  BP: (101-123)/(60-79) 101/60    General:   alert, appears stated age and cooperative   Lungs:   clear to auscultation bilaterally   Heart:   regular rate and rhythm and S1, S2 normal   Abdomen:  soft, non-tender; bowel sounds normal; no masses,  no organomegaly   Uterus:  firm located at the umblicus.    Extremities: peripheral pulses normal, no pedal edema, no clubbing or cyanosis     Lab Review  No results found for this or any previous visit (from the past 12 hour(s)).    I/O  No intake or output data in the 24 hours ending 01/06/17 0806     Assessment:     Patient Active Problem List   Diagnosis    Encounter for supervision of normal first pregnancy in second trimester    Abdominal pain during pregnancy    Pregnancy    Rh negative status during pregnancy in third trimester-rhogam given 10/11/16    Uterine contractions during pregnancy    Encounter for supervision of normal first pregnancy in third trimester    Vacuum extractor delivery, delivered        Plan:   1. Postpartum care:  - Patient doing well. Continue routine management and advances.  - Continue PO pain meds. Pain well controlled.  - Heme: H/h 11.6/34.5.  - Encourage ambulation      Dispo: As patient meets milestones, will plan to discharge home this AM.    Venessa Sarmiento MD  OB/GYN  Pager: [phone]

## 2017-01-06 NOTE — PLAN OF CARE
Discharge instructions given verbally and in writing.  Verbalized understanding.  Received Mother-Baby care guide during hospital stay.  Prescriptions given with explanation for use.  Verbalized understanding.  CDC vaccine information statement given and risk/benifits of vaccine discussed.  Tdap not given per pt. request, pt received vaccine in pregnancy.  States she feels comfortable taking care of baby and has demonstrated ability to care for  and herself.  Says she will have assistance when she returns home.  Discharged to home in stable condition via wheelchair with infant in arms.

## 2017-01-06 NOTE — DISCHARGE INSTRUCTIONS
"Patient Discharge Instructions for Postpartum Women    Resume Regular Diet  Increase activity gradually, no heavy lifting  Shower  No tampons, douching or sexual intercourse.  Discuss birth control options with your physician.  Wear a support bra  Return to work/school when you've been cleared by a physician    Call your physician if     *Fever of 100.4 or higher  *Persistent nausea/ vomiting  *Incisional drainage  *Heavy vaginal bleeding or large clots (Heavy bleeding is soaking 1 pad in an hour)  *Swelling and pain in arms or legs  *Severe headaches, blurred vision or fainting  *Shortness of breath  *Frequency and burning with urination  *Signs of postpartum depression, discuss these signs with your physician    Call lactation services for questions regarding feeding, nipple and breast care, and general questions about lactation.  They can be reached at [phone]         Understanding Postpartum Depression    You've just had a baby.  You know you should be excited and happy.  But instead you find yourself crying for no reason.  You may have trouble coping with your daily tasks.  You feel sad, tired, and hopeless most of the time.  You may even feel ashamed or guilty.  But what you're going through is not your fault and you can feel better.  Talk to your doctor.  He or she can help.    Depression After Childbirth    You may be weepy and tired right after giving birth.  These feelings are normal.  They're sometimes called the "baby blues."  These blues go away 2-3 weeks.  However, postpartum (meaning "after birth") depression lasts much longer and is more sever than the "baby blues."  It can make you feel sad and hopeless.  You may also fear that your baby will be harmed and worry about being a bad mother.      What is Depression?    Depression is a mood disorder that affects the way you think and feel.  The most common symptom is a feeling of deep sadness.  You may also feel as if you just can't cope with life.  "   Other symptoms include:      * Gaining or loosing weight  * Sleeping too much or too little  * Feeling tired all the time  * Feeling restless  * Fears of harming your baby   * Lack of interest in your baby  * Feeling worthless or guilty  * No longer finding pleasure in things you used to  * Having trouble thinking clearly or making decisions  * Thoughts of hurting yourself or your baby    What Causes Postpartum Depression    The exact causes of postpartum depression isn't known.  It may be due to changes in your hormones during and after childbirth.  You may also be tired from caring for your baby and adjusting to being a mother.  All these factors may make you feel depressed.  In some cases, your genes may also play a role.    Depression Can Be Treated    The good news is that there are many ways to treat postpartum depression.  Talking to your doctor is the first step toward feeling better.    Resources:    * National [place] of Mental Health  -- 142.471.8762    www.nimh.nih.gov    * National [place] on Mental Illness --901.688.7746    Www.[place].org    * Mental Health [place] -- 856.755.7258     Www.[facility].org    * National Suicide Hotline --395.566.4105 (800-SUICIDE)    6995-0326 [employer]  All rights reserved.  This information is not intended as a substitute for professional medical care.  Always follow up with your healthcare professional's instructions.

## 2017-01-06 NOTE — DISCHARGE SUMMARY
Delivery Discharge Summary  Obstetrics      Primary OB Clinician: Tosrten    Admission date: 2017  Discharge date: 2017    Admit Dx:   Patient Active Problem List   Diagnosis    Encounter for supervision of normal first pregnancy in second trimester    Abdominal pain during pregnancy    Pregnancy    Rh negative status during pregnancy in third trimester-rhogam given 10/11/16    Uterine contractions during pregnancy    Encounter for supervision of normal first pregnancy in third trimester    Vacuum extractor delivery, delivered     Discharge Dx:   Patient Active Problem List   Diagnosis    Encounter for supervision of normal first pregnancy in second trimester    Abdominal pain during pregnancy    Pregnancy    Rh negative status during pregnancy in third trimester-rhogam given 10/11/16    Uterine contractions during pregnancy    Encounter for supervision of normal first pregnancy in third trimester    Vacuum extractor delivery, delivered       Disposition: To home, self care    Procedure:     Hospital Course:  Pt is a 21 y.o. now  by , PPD # 2 was admitted on 2017 for labor. On initial assessment, vital signs were stable.  Patient was subsequently admitted to labor and delivery unit.  Patient subsequently delivered a viable infant, please see delivery information below or full delivery note for further details. Pt was in stable condition post delivery and was transferred to the Mother-Baby Unit in a stable condition. Her postpartum course was uncomplicated. On discharge day, patient's pain is well  controlled with oral pain medications. Pt is tolerating ambulation without SOB or CP, and PO diet without N/V. Reports lochia is minimal in degree. Denies any HA, vision changes, F/C, LE swelling. Post Partum H/H:11.6/34.5 Pt in stable condition and ready for discharge, instructed to continue PNV, pain medications, and to follow up in the OB clinic in 6 weeks with   Torsten.    Delivery:    Episiotomy: Right Mediolateral   Lacerations: None   Repair suture:     Repair # of packets: 2   Blood loss (ml): 200     Birth information:  YOB: 2019   Time of birth: 2:43 PM   Sex: female   Delivery type: Vaginal, Vacuum (Extractor)   Gestational Age: 40w1d    Delivery Clinician:      Other providers:       Additional  information:  Forceps:    Vacuum:    Breech:    Observed anomalies      Living?:           APGARS  One minute Five minutes Ten minutes   Skin color:         Heart rate:         Grimace:         Muscle tone:         Breathing:         Totals: 8  9        Placenta: Delivered:       appearance      Disposition:Home or Self Care    Patient Instructions:   Current Discharge Medication List      START taking these medications    Details   ibuprofen (ADVIL,MOTRIN) 800 MG tablet Take 1 tablet (800 mg total) by mouth every 8 (eight) hours as needed for Pain.  Qty: 30 tablet, Refills: 0      oxycodone-acetaminophen (PERCOCET) 5-325 mg per tablet Take 1 tablet by mouth every 6 (six) hours as needed for Pain.  Qty: 30 tablet, Refills: 0               Discharge Procedure Orders  Diet general     Activity as tolerated     Call MD for:  temperature >100.4     Call MD for:  persistent nausea and vomiting or diarrhea     Call MD for:  severe uncontrolled pain     Call MD for:  difficulty breathing or increased cough     Call MD for:  severe persistent headache     Call MD for:  worsening rash     Call MD for:  persistent dizziness, light-headedness, or visual disturbances     Call MD for:  increased confusion or weakness     1/4/2017      Venessa Sarmiento MD  OB/GYN  Pager: [phone]

## 2017-02-17 ENCOUNTER — POSTPARTUM VISIT (OUTPATIENT)
Dept: OBSTETRICS AND GYNECOLOGY | Facility: CLINIC | Age: 22
End: 2017-02-17
Payer: COMMERCIAL

## 2017-02-17 VITALS
HEIGHT: 68 IN | BODY MASS INDEX: 19.05 KG/M2 | SYSTOLIC BLOOD PRESSURE: 120 MMHG | WEIGHT: 125.69 LBS | DIASTOLIC BLOOD PRESSURE: 70 MMHG

## 2017-02-17 DIAGNOSIS — Z12.4 SCREENING FOR CERVICAL CANCER: ICD-10-CM

## 2017-02-17 DIAGNOSIS — Z30.9 ENCOUNTER FOR CONTRACEPTIVE MANAGEMENT, UNSPECIFIED CONTRACEPTIVE ENCOUNTER TYPE: ICD-10-CM

## 2017-02-17 PROCEDURE — 88175 CYTOPATH C/V AUTO FLUID REDO: CPT

## 2017-02-17 PROCEDURE — 0503F POSTPARTUM CARE VISIT: CPT | Mod: S$GLB,,, | Performed by: OBSTETRICS & GYNECOLOGY

## 2017-02-17 PROCEDURE — 99999 PR PBB SHADOW E&M-EST. PATIENT-LVL III: CPT | Mod: PBBFAC,,, | Performed by: OBSTETRICS & GYNECOLOGY

## 2017-02-17 RX ORDER — NORGESTIMATE AND ETHINYL ESTRADIOL 7DAYSX3 28
1 KIT ORAL DAILY
Qty: 28 TABLET | Refills: 11 | Status: SHIPPED | OUTPATIENT
Start: 2017-02-17 | End: 2018-01-22 | Stop reason: SDUPTHER

## 2017-02-17 NOTE — PROGRESS NOTES
"CC: Post-partum follow-up    Joy Briggs is a 21 y.o. female  who presents for post-partum visit.  Pregnancy was uncomplicated. She is S/P a VAVD with RML episiotomy.  She and the baby are doing well.  No pain.  No fever.  No bowel / bladder complaints.    Delivery Date: 2017  Delivery MD: Torsten  Gender: female  Birth Weight: 8 pounds 10 ounces  Breast Feeding: NO  Depression: NO  Contraception: oral contraceptives (estrogen/progesterone)    Visit Vitals    /70    Ht 5' 8" (1.727 m)    Wt 57 kg (125 lb 10.6 oz)    LMP 2016    BMI 19.11 kg/m2       ROS:  GENERAL: No fever, chills, fatigability.  VULVAR: No pain, no lesions and no itching.  VAGINAL: No relaxation, no itching, no discharge, no abnormal bleeding and no lesions.  ABDOMEN: No abdominal pain. Denies nausea. Denies vomiting. No diarrhea. No constipation  BREAST: Denies pain. No lumps. No discharge.  URINARY: No incontinence, no nocturia, no frequency and no dysuria.  CARDIOVASCULAR: No chest pain. No shortness of breath. No leg cramps.  NEUROLOGICAL: No headaches. No vision changes.    PHYSICAL EXAM:  General: alert, no distress  Heart:regular rate and rhythm, S1, S2 normal  Lungs: clear to auscultation bilaterally  Abdomen: Soft, non-tender, non-distended  Vulva:  Normal, no lesions  Cervix:  Without lesions, polyps or tenderness.  Uterus:  Normal size, shape, consistency, no mass or tenderness.  Adnexa:  Normal in size without mass or tenderness  Extremities: peripheral pulses normal, no pedal edema, no clubbing or cyanosis    ASSESMENT:  Doing well S/P   Instructions / precautions reviewed  Contraceptive counseling- Rx OCP sent  Pap today      PLAN:  May resume normal activities    Return in about 1 year (around 2018).      Venessa Sarmiento MD  OB/GYN  Pager: [phone]    "

## 2018-01-22 DIAGNOSIS — Z30.9 ENCOUNTER FOR CONTRACEPTIVE MANAGEMENT: ICD-10-CM

## 2018-01-22 RX ORDER — NORGESTIMATE AND ETHINYL ESTRADIOL 7DAYSX3 28
KIT ORAL
Qty: 28 TABLET | Refills: 11 | Status: SHIPPED | OUTPATIENT
Start: 2018-01-22 | End: 2018-12-26 | Stop reason: SDUPTHER

## 2018-03-28 ENCOUNTER — HOSPITAL ENCOUNTER (EMERGENCY)
Facility: HOSPITAL | Age: 23
Discharge: HOME | End: 2018-03-28
Attending: EMERGENCY MEDICINE

## 2018-03-28 VITALS
BODY MASS INDEX: 16.89 KG/M2 | OXYGEN SATURATION: 99 % | WEIGHT: 118 LBS | HEART RATE: 80 BPM | TEMPERATURE: 99 F | DIASTOLIC BLOOD PRESSURE: 72 MMHG | RESPIRATION RATE: 15 BRPM | HEIGHT: 70 IN | SYSTOLIC BLOOD PRESSURE: 118 MMHG

## 2018-03-28 DIAGNOSIS — L03.116 CELLULITIS OF LEFT LOWER EXTREMITY: Primary | ICD-10-CM

## 2018-03-28 LAB
B-HCG UR QL: NEGATIVE
BASOPHILS # BLD AUTO: 0.02 K/UL
BASOPHILS NFR BLD: 0.2 %
CTP QC/QA: YES
DIFFERENTIAL METHOD: ABNORMAL
EOSINOPHIL # BLD AUTO: 0.1 K/UL
EOSINOPHIL NFR BLD: 1.3 %
ERYTHROCYTE [DISTWIDTH] IN BLOOD BY AUTOMATED COUNT: 14.1 %
HCT VFR BLD AUTO: 38.2 %
HGB BLD-MCNC: 12.2 G/DL
LYMPHOCYTES # BLD AUTO: 4 K/UL
LYMPHOCYTES NFR BLD: 49 %
MCH RBC QN AUTO: 27.6 PG
MCHC RBC AUTO-ENTMCNC: 31.9 G/DL
MCV RBC AUTO: 86 FL
MONOCYTES # BLD AUTO: 0.5 K/UL
MONOCYTES NFR BLD: 6.6 %
NEUTROPHILS # BLD AUTO: 3.5 K/UL
NEUTROPHILS NFR BLD: 42.7 %
PLATELET # BLD AUTO: 276 K/UL
PMV BLD AUTO: 9.5 FL
RBC # BLD AUTO: 4.42 M/UL
WBC # BLD AUTO: 8.21 K/UL

## 2018-03-28 PROCEDURE — 99283 EMERGENCY DEPT VISIT LOW MDM: CPT | Mod: 25

## 2018-03-28 PROCEDURE — 25000003 PHARM REV CODE 250: Performed by: EMERGENCY MEDICINE

## 2018-03-28 PROCEDURE — 96365 THER/PROPH/DIAG IV INF INIT: CPT

## 2018-03-28 PROCEDURE — 85025 COMPLETE CBC W/AUTO DIFF WBC: CPT

## 2018-03-28 PROCEDURE — S0077 INJECTION, CLINDAMYCIN PHOSP: HCPCS | Performed by: EMERGENCY MEDICINE

## 2018-03-28 RX ORDER — CLINDAMYCIN HYDROCHLORIDE 300 MG/1
300 CAPSULE ORAL EVERY 6 HOURS
Qty: 40 CAPSULE | Refills: 0 | Status: SHIPPED | OUTPATIENT
Start: 2018-03-28 | End: 2018-04-04

## 2018-03-28 RX ORDER — CLINDAMYCIN PHOSPHATE 900 MG/50ML
900 INJECTION, SOLUTION INTRAVENOUS
Status: COMPLETED | OUTPATIENT
Start: 2018-03-28 | End: 2018-03-28

## 2018-03-28 RX ADMIN — CLINDAMYCIN PHOSPHATE 900 MG: 18 INJECTION, SOLUTION INTRAVENOUS at 07:03

## 2018-03-29 NOTE — ED PROVIDER NOTES
Encounter Date: 3/28/2018       History     Chief Complaint   Patient presents with    Rash     started as spots yvonne left inner thigh and now area is bigger and red and painful     Patient is a 22-year-old female who complains of pain and redness to her left thigh.  Patient first noticed 2 small red spots a few days ago which she believes may have been insect bites.  Since that time area of redness has spread.  She denies having any fever or chills.  Patient is not a diabetic.      The history is provided by the patient.     Review of patient's allergies indicates:  No Known Allergies  History reviewed. No pertinent past medical history.  History reviewed. No pertinent surgical history.  Family History   Problem Relation Age of Onset    Diabetes Maternal Grandmother     Hypertension Maternal Grandmother      Social History   Substance Use Topics    Smoking status: Never Smoker    Smokeless tobacco: Not on file    Alcohol use No     Review of Systems   Constitutional: Negative for chills and fever.   Respiratory: Negative for shortness of breath.    Gastrointestinal: Negative for nausea and vomiting.   Skin: Positive for color change and rash.   All other systems reviewed and are negative.      Physical Exam     Initial Vitals [03/28/18 1829]   BP Pulse Resp Temp SpO2   118/72 80 15 98.6 °F (37 °C) 99 %      MAP       87.33         Physical Exam    Nursing note and vitals reviewed.  Constitutional: No distress.   HENT:   Head: Normocephalic and atraumatic.   Neck: Normal range of motion. Neck supple.   Cardiovascular: Normal rate, regular rhythm and normal heart sounds.   Pulmonary/Chest: Breath sounds normal.   Musculoskeletal: Normal range of motion.   Neurological: She is alert and oriented to person, place, and time.   Skin:   There is a large area of redness of the left medial thigh surrounding 2 small areas of erythema and induration.  No areas of fluctuance.  No pustules or vesicles.   Psychiatric: Her  behavior is normal. Thought content normal.         ED Course   Procedures  Labs Reviewed   CBC W/ AUTO DIFFERENTIAL - Abnormal; Notable for the following:        Result Value    MCHC 31.9 (*)     Lymph% 49.0 (*)     All other components within normal limits             Medical Decision Making:   Clinical Tests:   Lab Tests: Ordered and Reviewed  ED Management:  22-year-old female with left thigh cellulitis.  She is afebrile and a white blood cell count is normal.  Patient was given 900 mg of gentamicin intravenously here in the ED.  She'll be discharged home with a prescription for clindamycin and advised to follow-up with primary physician as soon as able for recheck.  She will also return here for any worsening of her condition.                      Clinical Impression:   The encounter diagnosis was Cellulitis of left lower extremity.                           Zuhair Brewer MD  03/28/18 2289

## 2018-03-29 NOTE — ED NOTES
Pt presents to the ED w/ c/o of having a rash on the inner leftt thigh that began 2 days ago. Pt reports that the rash is painful and itchy. 3 large rashes noted on the inner left thigh.

## 2018-12-26 DIAGNOSIS — Z30.9 ENCOUNTER FOR CONTRACEPTIVE MANAGEMENT: ICD-10-CM

## 2018-12-26 RX ORDER — NORGESTIMATE AND ETHINYL ESTRADIOL 7DAYSX3 28
KIT ORAL
Qty: 28 TABLET | Refills: 2 | Status: SHIPPED | OUTPATIENT
Start: 2018-12-26 | End: 2020-08-04

## 2019-03-18 DIAGNOSIS — Z30.9 ENCOUNTER FOR CONTRACEPTIVE MANAGEMENT: ICD-10-CM

## 2019-03-18 RX ORDER — NORGESTIMATE AND ETHINYL ESTRADIOL 7DAYSX3 28
KIT ORAL
Qty: 28 TABLET | Refills: 11 | OUTPATIENT
Start: 2019-03-18

## 2019-03-19 DIAGNOSIS — Z30.9 ENCOUNTER FOR CONTRACEPTIVE MANAGEMENT: ICD-10-CM

## 2019-03-20 RX ORDER — NORGESTIMATE AND ETHINYL ESTRADIOL 7DAYSX3 28
KIT ORAL
Qty: 28 TABLET | Refills: 0 | Status: SHIPPED | OUTPATIENT
Start: 2019-03-20 | End: 2019-04-15 | Stop reason: SDUPTHER

## 2019-03-25 ENCOUNTER — TELEPHONE (OUTPATIENT)
Dept: OBSTETRICS AND GYNECOLOGY | Facility: CLINIC | Age: 24
End: 2019-03-25

## 2019-03-25 NOTE — TELEPHONE ENCOUNTER
Contacted pt regarding refill request received on fax from pharmacy. Pt states it was already refilled. Informed pt it was sent in as a 28 day supply only due to she is overdue for an annual exam, pt last seen 2/17/17. Advised pt to schedule annual appt due to she will not be able to get the rx refilled after this month, Patient verbalized understanding.

## 2019-04-15 DIAGNOSIS — Z30.9 ENCOUNTER FOR CONTRACEPTIVE MANAGEMENT: ICD-10-CM

## 2019-04-15 RX ORDER — NORGESTIMATE AND ETHINYL ESTRADIOL 7DAYSX3 28
KIT ORAL
Qty: 28 TABLET | Refills: 0 | Status: SHIPPED | OUTPATIENT
Start: 2019-04-15 | End: 2020-08-04

## 2019-05-12 DIAGNOSIS — Z30.9 ENCOUNTER FOR CONTRACEPTIVE MANAGEMENT: ICD-10-CM

## 2019-05-13 RX ORDER — NORGESTIMATE AND ETHINYL ESTRADIOL 7DAYSX3 28
KIT ORAL
Qty: 28 TABLET | Refills: 0 | OUTPATIENT
Start: 2019-05-13

## 2020-08-04 ENCOUNTER — HOSPITAL ENCOUNTER (EMERGENCY)
Facility: HOSPITAL | Age: 25
Discharge: HOME | End: 2020-08-04
Payer: OTHER GOVERNMENT

## 2020-08-04 VITALS
HEIGHT: 69 IN | RESPIRATION RATE: 20 BRPM | OXYGEN SATURATION: 100 % | WEIGHT: 120 LBS | SYSTOLIC BLOOD PRESSURE: 115 MMHG | BODY MASS INDEX: 17.77 KG/M2 | DIASTOLIC BLOOD PRESSURE: 69 MMHG | TEMPERATURE: 98 F | HEART RATE: 85 BPM

## 2020-08-04 DIAGNOSIS — R07.89 CHEST WALL PAIN: ICD-10-CM

## 2020-08-04 DIAGNOSIS — Z20.822 SUSPECTED COVID-19 VIRUS INFECTION: Primary | ICD-10-CM

## 2020-08-04 LAB
B-HCG UR QL: NEGATIVE
CTP QC/QA: YES

## 2020-08-04 PROCEDURE — 99284 EMERGENCY DEPT VISIT MOD MDM: CPT | Mod: 25

## 2020-08-04 PROCEDURE — 81025 URINE PREGNANCY TEST: CPT | Performed by: NURSE PRACTITIONER

## 2020-08-04 PROCEDURE — 93005 ELECTROCARDIOGRAM TRACING: CPT

## 2020-08-04 PROCEDURE — U0003 INFECTIOUS AGENT DETECTION BY NUCLEIC ACID (DNA OR RNA); SEVERE ACUTE RESPIRATORY SYNDROME CORONAVIRUS 2 (SARS-COV-2) (CORONAVIRUS DISEASE [COVID-19]), AMPLIFIED PROBE TECHNIQUE, MAKING USE OF HIGH THROUGHPUT TECHNOLOGIES AS DESCRIBED BY CMS-2020-01-R: HCPCS

## 2020-08-04 RX ORDER — NAPROXEN 500 MG/1
500 TABLET ORAL 2 TIMES DAILY WITH MEALS
Qty: 10 TABLET | Refills: 0 | Status: SHIPPED | OUTPATIENT
Start: 2020-08-04 | End: 2020-08-09

## 2020-08-04 NOTE — ED PROVIDER NOTES
Encounter Date: 8/4/2020       History     Chief Complaint   Patient presents with    Shortness of Breath     worsening cp/sob x2 weeks. describes pains as sharp and constatn for the last 2 days. pain is exacerbated by coughing and deep breathing. denies fever      24-year-old female presents emergency room with reports chest wall pain and occasional shortness of breath that has been intermittent for the past 2 weeks when taking a deep breath.  She denies cough or fever.  She denies any known COVID exposure.  Patient reports the pain hurts more when taking a deep breath.  She denies any previous cardiac history.  Patient has no past medical history.  Vital signs are stable with sat of 100% and heart rate of 85.  She is not toxic in appearance.  See physical examination.    The history is provided by the patient. No  was used.     Review of patient's allergies indicates:  No Known Allergies  History reviewed. No pertinent past medical history.  No past surgical history on file.  Family History   Problem Relation Age of Onset    Diabetes Maternal Grandmother     Hypertension Maternal Grandmother      Social History     Tobacco Use    Smoking status: Never Smoker   Substance Use Topics    Alcohol use: No    Drug use: No     Review of Systems   Constitutional: Negative for fever.   Respiratory: Positive for shortness of breath.    Cardiovascular: Positive for chest pain.       Physical Exam     Initial Vitals [08/04/20 1811]   BP Pulse Resp Temp SpO2   (!) 160/77 85 20 98.4 °F (36.9 °C) 100 %      MAP       --         Physical Exam    Constitutional: She appears well-developed and well-nourished. She is not diaphoretic. No distress.   Cardiovascular: Normal rate.   Pulmonary/Chest: No respiratory distress.   Reproducible chest wall pain noted with palpation. No crepitus. No edema or rash noted.    Musculoskeletal: Normal range of motion.   Neurological: She is oriented to person, place, and  time.   Psychiatric: She has a normal mood and affect. Her behavior is normal. Judgment and thought content normal.         ED Course   Procedures  Labs Reviewed   SARS-COV-2 (COVID-19) QUALITATIVE PCR   POCT URINE PREGNANCY          Imaging Results          X-Ray Chest PA And Lateral (Final result)  Result time 08/04/20 19:07:59    Final result by Wil Almaguer MD (08/04/20 19:07:59)                 Impression:      1. No acute cardiopulmonary process.      Electronically signed by: Wil Almaguer MD  Date:    08/04/2020  Time:    19:07             Narrative:    EXAMINATION:  XR CHEST PA AND LATERAL    CLINICAL HISTORY:  Other chest pain    TECHNIQUE:  PA and lateral views of the chest were performed.    COMPARISON:  None    FINDINGS:  The cardiomediastinal silhouette is not enlarged.  There is no pleural effusion.  The trachea is midline.  The lungs are symmetrically expanded bilaterally without evidence of acute parenchymal process. No large focal consolidation seen.  There is no pneumothorax.  The osseous structures are remarkable for levo scoliotic curvature..                                 Medical Decision Making:   Initial Assessment:   24-year-old female presents emergency room with reports chest wall pain and occasional shortness of breath that has been intermittent for the past 2 weeks when taking a deep breath.  She denies cough or fever.  She denies any known COVID exposure.  Patient reports the pain hurts more when taking a deep breath.  She denies any previous cardiac history.  Patient has no past medical history.  Vital signs are stable with sat of 100% and heart rate of 85.  She is not toxic in appearance.  See physical examination.    The history is provided by the patient. No  was used.     Differential Diagnosis:   Pneumonia, STEMI, Infectious process, Cardiac arrythmia  Clinical Tests:   Lab Tests: Ordered  Radiological Study: Ordered  Medical Tests: Ordered  ED  Management:  ED Course as of Aug 04 1929  Tue Aug 04, 2020  1928 Pt notified of the negative chest xray and EKG. She will also be covid testing as she seems to be concerning even though denying COVID + exposure. Will contact with results of testing. Pt is stable at this time for dc and is to return if condition worsens. Again sats 100% with NSR-85 heart rate.     (DT)    ED Course User Index  (DT) Katiana Ramos NP    Other:   I discussed test(s) with the performing physician.                   ED Course as of Aug 04 1930   Tue Aug 04, 2020   1928 Pt notified of the negative chest xray and EKG. She will also be covid testing as she seems to be concerning even though denying COVID + exposure. Will contact with results of testing. Pt is stable at this time for dc and is to return if condition worsens. Again sats 100% with NSR-85 heart rate.     [DT]      ED Course User Index  [DT] Katiana Ramos NP                Clinical Impression:       ICD-10-CM ICD-9-CM   1. Suspected Covid-19 Virus Infection  R68.89    2. Chest wall pain  R07.89 786.52             ED Disposition Condition    Discharge Stable        ED Prescriptions     Medication Sig Dispense Start Date End Date Auth. Provider    naproxen (NAPROSYN) 500 MG tablet Take 1 tablet (500 mg total) by mouth 2 (two) times daily with meals. for 5 days 10 tablet 8/4/2020 8/9/2020 Katiana Ramos NP        Follow-up Information     Follow up With Specialties Details Why Contact Info    Vince Andre,  Family Medicine Schedule an appointment as soon as possible for a visit in 2 days  [address]  [address]  [facility] [address] [phone]

## 2020-08-04 NOTE — ED NOTES
APPEARANCE: Alert, oriented and in no acute distress.  CARDIAC: Normal rate and rhythm, no murmur heard.   PERIPHERAL VASCULAR: peripheral pulses present. Normal cap refill. No edema. Warm to touch.    RESPIRATORY:Normal rate and effort, breath sounds clear bilaterally throughout chest. Respirations are equal and unlabored no obvious signs of distress. Pt reports she is short of breath when trying to take a deep breath.   GASTRO: soft, bowel sounds normal, no tenderness, no abdominal distention.  MUSC: Full ROM. No bony tenderness or soft tissue tenderness. No obvious deformity.  SKIN: Skin is warm and dry, normal skin turgor, mucous membranes moist.  NEURO: 5/5 strength major flexors/extensors bilaterally. Sensory intact to light touch bilaterally. [place] coma scale: eyes open spontaneously-4, oriented & converses-5, obeys commands-6. No neurological abnormalities.   MENTAL STATUS: awake, alert and aware of environment.  EYE: PERRL, both eyes: pupils brisk and reactive to light. Normal size.  ENT: EARS: no obvious drainage. NOSE: no active bleeding.

## 2020-08-05 LAB — SARS-COV-2 RNA RESP QL NAA+PROBE: NOT DETECTED

## 2021-04-13 ENCOUNTER — PATIENT MESSAGE (OUTPATIENT)
Dept: RESEARCH | Facility: HOSPITAL | Age: 26
End: 2021-04-13

## 2022-10-27 ENCOUNTER — OFFICE VISIT (OUTPATIENT)
Dept: URGENT CARE | Facility: CLINIC | Age: 27
End: 2022-10-27

## 2022-10-27 VITALS
HEIGHT: 69 IN | OXYGEN SATURATION: 100 % | SYSTOLIC BLOOD PRESSURE: 112 MMHG | WEIGHT: 120 LBS | DIASTOLIC BLOOD PRESSURE: 78 MMHG | RESPIRATION RATE: 19 BRPM | TEMPERATURE: 98 F | HEART RATE: 75 BPM | BODY MASS INDEX: 17.77 KG/M2

## 2022-10-27 DIAGNOSIS — R21 RASH: Primary | ICD-10-CM

## 2022-10-27 PROCEDURE — 99203 OFFICE O/P NEW LOW 30 MIN: CPT | Mod: S$GLB,,, | Performed by: FAMILY MEDICINE

## 2022-10-27 PROCEDURE — 99203 PR OFFICE/OUTPT VISIT, NEW, LEVL III, 30-44 MIN: ICD-10-PCS | Mod: S$GLB,,, | Performed by: FAMILY MEDICINE

## 2022-10-27 RX ORDER — LORATADINE 10 MG/1
10 TABLET ORAL DAILY
Qty: 30 TABLET | Refills: 2 | Status: SHIPPED | OUTPATIENT
Start: 2022-10-27 | End: 2023-06-28

## 2022-10-27 NOTE — PROGRESS NOTES
"Subjective:       Patient ID: Joy Briggs is a 26 y.o. female.    Vitals:  height is 5' 9" (1.753 m) and weight is 54.4 kg (120 lb). Her temperature is 98.2 °F (36.8 °C). Her blood pressure is 112/78 and her pulse is 75. Her respiration is 19 and oxygen saturation is 100%.     Chief Complaint: Rash    Patient presents to clinic with a swollen face, face has some redness, pt states its itchy and very dry, X 2 days, pt states this has happen four times, home treatments include benadryl.   States rash was more pronounced yesterday am, and this am, went down after taking benadryl  Some itching, denies any pain  Denies any unusual exposure      Rash  This is a new problem. The current episode started in the past 7 days. The problem is unchanged. The affected locations include the face. She was exposed to nothing. The treatment provided no relief.     Skin:  Positive for rash.     Objective:      Physical Exam   Constitutional: She is oriented to person, place, and time. She appears well-developed. She is cooperative.  Non-toxic appearance. She does not appear ill. No distress.   HENT:   Head: Normocephalic and atraumatic.   Ears:   Right Ear: Hearing, tympanic membrane, external ear and ear canal normal.   Left Ear: Hearing, tympanic membrane, external ear and ear canal normal.   Nose: Nose normal. No mucosal edema, rhinorrhea or nasal deformity. No epistaxis. Right sinus exhibits no maxillary sinus tenderness and no frontal sinus tenderness. Left sinus exhibits no maxillary sinus tenderness and no frontal sinus tenderness.   Mouth/Throat: Uvula is midline, oropharynx is clear and moist and mucous membranes are normal. No trismus in the jaw. Normal dentition. No uvula swelling. No posterior oropharyngeal erythema.   Eyes: Conjunctivae and lids are normal. Right eye exhibits no discharge. Left eye exhibits no discharge. No scleral icterus.   Neck: Trachea normal and phonation normal. Neck supple.   Cardiovascular: " Normal rate, regular rhythm, normal heart sounds and normal pulses.   Pulmonary/Chest: Effort normal and breath sounds normal. No respiratory distress.   Abdominal: Normal appearance and bowel sounds are normal. She exhibits no distension and no mass. Soft. There is no abdominal tenderness.   Musculoskeletal: Normal range of motion.         General: No deformity. Normal range of motion.   Neurological: She is alert and oriented to person, place, and time. She exhibits normal muscle tone. Coordination normal.   Skin: Skin is warm, dry, intact, not diaphoretic and not pale.         Comments: Mild erythematous rash on both cheeks, no papulrs or vesicles     Psychiatric: Her speech is normal and behavior is normal. Judgment and thought content normal.   Nursing note and vitals reviewed.      Assessment:       1. Rash          Plan:         Rash    Other orders  -     loratadine (CLARITIN) 10 mg tablet; Take 1 tablet (10 mg total) by mouth once daily.  Dispense: 30 tablet; Refill: 2           Etiology unknown    Reassurance    RTC or follo up with PCP if sx persists

## 2023-05-29 ENCOUNTER — PATIENT MESSAGE (OUTPATIENT)
Dept: OBSTETRICS AND GYNECOLOGY | Facility: CLINIC | Age: 28
End: 2023-05-29
Payer: COMMERCIAL

## 2023-06-28 ENCOUNTER — OFFICE VISIT (OUTPATIENT)
Dept: OBSTETRICS AND GYNECOLOGY | Facility: CLINIC | Age: 28
End: 2023-06-28
Payer: COMMERCIAL

## 2023-06-28 VITALS
HEIGHT: 69 IN | BODY MASS INDEX: 17.55 KG/M2 | DIASTOLIC BLOOD PRESSURE: 77 MMHG | WEIGHT: 118.5 LBS | SYSTOLIC BLOOD PRESSURE: 119 MMHG

## 2023-06-28 DIAGNOSIS — Z12.4 SCREENING FOR CERVICAL CANCER: ICD-10-CM

## 2023-06-28 DIAGNOSIS — N64.4 BREAST PAIN, RIGHT: Primary | ICD-10-CM

## 2023-06-28 PROCEDURE — 99203 OFFICE O/P NEW LOW 30 MIN: CPT | Mod: S$GLB,,, | Performed by: OBSTETRICS & GYNECOLOGY

## 2023-06-28 PROCEDURE — 99999 PR PBB SHADOW E&M-EST. PATIENT-LVL III: ICD-10-PCS | Mod: PBBFAC,,, | Performed by: OBSTETRICS & GYNECOLOGY

## 2023-06-28 PROCEDURE — 3078F DIAST BP <80 MM HG: CPT | Mod: CPTII,S$GLB,, | Performed by: OBSTETRICS & GYNECOLOGY

## 2023-06-28 PROCEDURE — 1159F PR MEDICATION LIST DOCUMENTED IN MEDICAL RECORD: ICD-10-PCS | Mod: CPTII,S$GLB,, | Performed by: OBSTETRICS & GYNECOLOGY

## 2023-06-28 PROCEDURE — 3008F BODY MASS INDEX DOCD: CPT | Mod: CPTII,S$GLB,, | Performed by: OBSTETRICS & GYNECOLOGY

## 2023-06-28 PROCEDURE — 3078F PR MOST RECENT DIASTOLIC BLOOD PRESSURE < 80 MM HG: ICD-10-PCS | Mod: CPTII,S$GLB,, | Performed by: OBSTETRICS & GYNECOLOGY

## 2023-06-28 PROCEDURE — 1160F RVW MEDS BY RX/DR IN RCRD: CPT | Mod: CPTII,S$GLB,, | Performed by: OBSTETRICS & GYNECOLOGY

## 2023-06-28 PROCEDURE — 99999 PR PBB SHADOW E&M-EST. PATIENT-LVL III: CPT | Mod: PBBFAC,,, | Performed by: OBSTETRICS & GYNECOLOGY

## 2023-06-28 PROCEDURE — 3008F PR BODY MASS INDEX (BMI) DOCUMENTED: ICD-10-PCS | Mod: CPTII,S$GLB,, | Performed by: OBSTETRICS & GYNECOLOGY

## 2023-06-28 PROCEDURE — 3074F PR MOST RECENT SYSTOLIC BLOOD PRESSURE < 130 MM HG: ICD-10-PCS | Mod: CPTII,S$GLB,, | Performed by: OBSTETRICS & GYNECOLOGY

## 2023-06-28 PROCEDURE — 88175 CYTOPATH C/V AUTO FLUID REDO: CPT | Performed by: OBSTETRICS & GYNECOLOGY

## 2023-06-28 PROCEDURE — 1160F PR REVIEW ALL MEDS BY PRESCRIBER/CLIN PHARMACIST DOCUMENTED: ICD-10-PCS | Mod: CPTII,S$GLB,, | Performed by: OBSTETRICS & GYNECOLOGY

## 2023-06-28 PROCEDURE — 99203 PR OFFICE/OUTPT VISIT, NEW, LEVL III, 30-44 MIN: ICD-10-PCS | Mod: S$GLB,,, | Performed by: OBSTETRICS & GYNECOLOGY

## 2023-06-28 PROCEDURE — 3074F SYST BP LT 130 MM HG: CPT | Mod: CPTII,S$GLB,, | Performed by: OBSTETRICS & GYNECOLOGY

## 2023-06-28 PROCEDURE — 1159F MED LIST DOCD IN RCRD: CPT | Mod: CPTII,S$GLB,, | Performed by: OBSTETRICS & GYNECOLOGY

## 2023-06-28 NOTE — PROGRESS NOTES
"       GYNECOLOGY OFFICE NOTE    Reason for visit: Right breast lump/pain    HPI: Pt is a 27 y.o.  female  who presents for R breast pain and lump felt ~ 2 months ago. Denies rash, nipple discharge. Does reports recent trauma when lifting heavy. Also desires pap today       History reviewed. No pertinent past medical history.    History reviewed. No pertinent surgical history.    Family History   Problem Relation Age of Onset    Diabetes Maternal Grandmother     Hypertension Maternal Grandmother        Social History     Tobacco Use    Smoking status: Never     Passive exposure: Never    Smokeless tobacco: Never   Substance Use Topics    Alcohol use: No    Drug use: No       OB History    Para Term  AB Living   1 1 1 0 0 1   SAB IAB Ectopic Multiple Live Births   0 0 0 0 1      # Outcome Date GA Lbr Ganesh/2nd Weight Sex Delivery Anes PTL Lv   1 Term 17 40w1d 10:00 / 00:43 3.679 kg (8 lb 1.8 oz) F Vag-Vacuum Spinal, EPI N GRANT       Current Outpatient Medications   Medication Sig    loratadine (CLARITIN) 10 mg tablet Take 1 tablet (10 mg total) by mouth once daily. (Patient not taking: Reported on 2023)     No current facility-administered medications for this visit.       Allergies: Patient has no known allergies.     /77   Ht 5' 9" (1.753 m)   Wt 53.8 kg (118 lb 8 oz)   LMP 2023 (Approximate)   Breastfeeding Unknown   BMI 17.50 kg/m²     ROS:  GENERAL: Denies fever or chills.   SKIN: Denies rash or lesions.   HEAD: Denies head injury or headache.   CHEST: Denies chest pain or shortness of breath.   CARDIOVASCULAR: Denies palpitations or chest pain.   ABDOMEN: No constipation, diarrhea, nausea, vomiting or rectal bleeding.   URINARY: No dysuria, hematuria, or burning on urination.  REPRODUCTIVE: See HPI.   BREASTS: see HPI  NEUROLOGIC: Denies syncope or weakness.     Physical Exam:  GENERAL: alert, appears stated age and cooperative  NEUROLOGIC: orientated to person, " place and time, normal mood and affect   CHEST: Normal respiratory effort  NECK: normal appearance  SKIN: no acne, hirsutism  BREAST EXAM: breasts appear normal, no suspicious masses, no skin or nipple changes or axillary nodes, bilateral dense tissue  ABDOMEN: abdomen is soft without significant tenderness, masses  EXTERNAL GENITALIA:  normal general appearance  URETHRA: normal urethra, normal urethral meatus  VAGINA:  normal mucosa, no  lesions  CERVIX:  Normal  UTERUS:  mobile, non tender  ADNEXA: nontender    Diagnosis:  1. Breast pain, right    2. Screening for cervical cancer        Plan:   1. F/u R breast imaging  2. Pap today    Orders Placed This Encounter    US Breast Right Limited    Liquid-Based Pap Smear, Screening           Face to Face time with patient: 30 minutes of total time spent on the encounter, which includes face to face time and non-face to face time preparing to see the patient (eg, review of tests), Obtaining and/or reviewing separately obtained history, Documenting clinical information in the electronic or other health record, Independently interpreting results (not separately reported) and communicating results to the patient/family/caregiver, or Care coordination (not separately reported).         Venessa Sarmiento MD  OB/GYN

## 2023-07-06 ENCOUNTER — PATIENT MESSAGE (OUTPATIENT)
Dept: OBSTETRICS AND GYNECOLOGY | Facility: CLINIC | Age: 28
End: 2023-07-06
Payer: COMMERCIAL

## 2023-07-06 LAB
CLINICAL INFO: NORMAL
CYTO CVX: NORMAL
CYTOLOGIST CVX/VAG CYTO: NORMAL
CYTOLOGIST CVX/VAG CYTO: NORMAL
CYTOLOGY CMNT CVX/VAG CYTO-IMP: NORMAL
CYTOLOGY PAP THIN PREP EXPLANATION: NORMAL
DATE OF PREVIOUS PAP: NO
DATE PREVIOUS BX: NO
GEN CATEG CVX/VAG CYTO-IMP: NORMAL
LMP START DATE: NORMAL
MICROORGANISM CVX/VAG CYTO: NORMAL
PATHOLOGIST CVX/VAG CYTO: NORMAL
SERVICE CMNT-IMP: NORMAL
SPECIMEN SOURCE CVX/VAG CYTO: NORMAL
STAT OF ADQ CVX/VAG CYTO-IMP: NORMAL

## 2023-08-15 ENCOUNTER — HOSPITAL ENCOUNTER (OUTPATIENT)
Dept: RADIOLOGY | Facility: HOSPITAL | Age: 28
Discharge: HOME | End: 2023-08-15
Attending: OBSTETRICS & GYNECOLOGY
Payer: COMMERCIAL

## 2023-08-15 DIAGNOSIS — N64.4 BREAST PAIN, RIGHT: ICD-10-CM

## 2023-08-15 PROCEDURE — 76642 US BREAST RIGHT LIMITED: ICD-10-PCS | Mod: 26,RT,, | Performed by: RADIOLOGY

## 2023-08-15 PROCEDURE — 76642 ULTRASOUND BREAST LIMITED: CPT | Mod: 26,RT,, | Performed by: RADIOLOGY

## 2023-08-15 PROCEDURE — 76642 ULTRASOUND BREAST LIMITED: CPT | Mod: TC,RT
